# Patient Record
Sex: FEMALE | Race: BLACK OR AFRICAN AMERICAN | NOT HISPANIC OR LATINO | URBAN - METROPOLITAN AREA
[De-identification: names, ages, dates, MRNs, and addresses within clinical notes are randomized per-mention and may not be internally consistent; named-entity substitution may affect disease eponyms.]

---

## 2017-01-06 ENCOUNTER — OUTPATIENT (OUTPATIENT)
Dept: OUTPATIENT SERVICES | Facility: HOSPITAL | Age: 67
LOS: 1 days | Discharge: HOME | End: 2017-01-06
Payer: OTHER MISCELLANEOUS

## 2017-01-06 VITALS
SYSTOLIC BLOOD PRESSURE: 135 MMHG | RESPIRATION RATE: 16 BRPM | WEIGHT: 150.8 LBS | HEART RATE: 69 BPM | TEMPERATURE: 99 F | DIASTOLIC BLOOD PRESSURE: 73 MMHG | OXYGEN SATURATION: 97 % | HEIGHT: 64 IN

## 2017-01-06 VITALS
HEART RATE: 62 BPM | OXYGEN SATURATION: 96 % | TEMPERATURE: 98 F | DIASTOLIC BLOOD PRESSURE: 82 MMHG | SYSTOLIC BLOOD PRESSURE: 140 MMHG | RESPIRATION RATE: 16 BRPM

## 2017-01-06 DIAGNOSIS — E04.9 NONTOXIC GOITER, UNSPECIFIED: ICD-10-CM

## 2017-01-06 DIAGNOSIS — Z90.89 ACQUIRED ABSENCE OF OTHER ORGANS: Chronic | ICD-10-CM

## 2017-01-06 DIAGNOSIS — R56.9 UNSPECIFIED CONVULSIONS: ICD-10-CM

## 2017-01-06 DIAGNOSIS — Z96.659 PRESENCE OF UNSPECIFIED ARTIFICIAL KNEE JOINT: Chronic | ICD-10-CM

## 2017-01-06 DIAGNOSIS — Z90.710 ACQUIRED ABSENCE OF BOTH CERVIX AND UTERUS: Chronic | ICD-10-CM

## 2017-01-06 DIAGNOSIS — M24.661 ANKYLOSIS, RIGHT KNEE: ICD-10-CM

## 2017-01-06 DIAGNOSIS — Z41.9 ENCOUNTER FOR PROCEDURE FOR PURPOSES OTHER THAN REMEDYING HEALTH STATE, UNSPECIFIED: Chronic | ICD-10-CM

## 2017-01-06 PROCEDURE — 27335 REMOVE KNEE JOINT LINING: CPT | Mod: RT

## 2017-01-06 PROCEDURE — 88304 TISSUE EXAM BY PATHOLOGIST: CPT

## 2017-01-06 RX ORDER — ONDANSETRON 8 MG/1
4 TABLET, FILM COATED ORAL ONCE
Qty: 0 | Refills: 0 | Status: DISCONTINUED | OUTPATIENT
Start: 2017-01-06 | End: 2017-01-06

## 2017-01-06 RX ORDER — MORPHINE SULFATE 50 MG/1
4 CAPSULE, EXTENDED RELEASE ORAL
Qty: 0 | Refills: 0 | Status: DISCONTINUED | OUTPATIENT
Start: 2017-01-06 | End: 2017-01-06

## 2017-01-06 RX ORDER — SODIUM CHLORIDE 9 MG/ML
1000 INJECTION, SOLUTION INTRAVENOUS
Qty: 0 | Refills: 0 | Status: DISCONTINUED | OUTPATIENT
Start: 2017-01-06 | End: 2017-01-06

## 2017-01-06 RX ORDER — NABUMETONE 750 MG
1 TABLET ORAL
Qty: 14 | Refills: 0 | OUTPATIENT
Start: 2017-01-06 | End: 2017-01-13

## 2017-01-06 NOTE — PACU DISCHARGE NOTE - COMMENTS
Discharge instructions given to patient and family member who verbalized understanding. denies pain, cleared by MD for Discharge home

## 2017-01-06 NOTE — CONSULT NOTE ADULT - SUBJECTIVE AND OBJECTIVE BOX
HPI:      PAST MEDICAL & SURGICAL HISTORY:  OA (osteoarthritis)  Seizure: Last seizure &gt; 5 years  H/O: hysterectomy: partial for fibroids  Surgery, elective: Right shoulder sx  History of tonsillectomy  Total knee replacement status: left knee 2009      REVIEW OF SYSTEMS    General:  malaise	  Skin/Breast: normal  Ophthalmologic: negative  ENMT:	normal  Respiratory and Thorax: normal  Cardiovascular:	normal  Gastrointestinal:	normal  Genitourinary:	normal  Musculoskeletal:	swelling   Neurological:	normal  Psychiatric:	normal  Hematology/Lymphatics:	negative  Endocrine:	negative  Allergic/Immunologic:	negative      MEDICATIONS  (STANDING):    MEDICATIONS  (PRN):      Allergies    penicillamine (Hives)  shellfish (Hives)    Intolerances        SOCIAL HISTORY:    FAMILY HISTORY:      PHYSICAL EXAM:  Daily     Daily     Vital Signs Last 24 Hrs  T(C): --  T(F): --  HR: --  BP: --  BP(mean): --  RR: --  SpO2: --    Constitutional: WDWNF in NAD  Eyes: conj pale  ENMT: negative  Neck: supple  Breasts: not examined   Back: negative  Respiratory: clear to P&A  Cardiovascular: no MRGT or H  Gastrointestinal: normal bowel sounds  Genitourinary: neg  Rectal: not examined  Extremities: edema          leg  Vascular: normal  Neurological: normal  Skin: negative  Lymph Nodes: negative  Musculoskeletal:   decreased ROM    Psychiatric: anxiety      LABS: HPI:  66 year old female with right knee DJD s/p right TKR with moderate pain and decreased ROM.  X rays consistent with arthrofibrosis.  Symptoms worse after prolonged imobility or stairs and persist since surgery 2/05/2016. Original injury at work.    PAST MEDICAL & SURGICAL HISTORY:  allergic rhinitis goiter  OA (osteoarthritis)  Seizure: Last seizure 5 years ago  H/O: hysterectomy: partial for fibroids  Surgery, elective: Right shoulder sx  History of tonsillectomy  Total knee replacement status: left knee 2009      REVIEW OF SYSTEMS    General:  malaise	  Skin/Breast: normal  Ophthalmologic: negative  ENMT:	normal  Respiratory and Thorax: normal  Cardiovascular:	normal  Gastrointestinal:	normal  Genitourinary:	normal  Musculoskeletal: right knee swelling   Neurological:	normal  Psychiatric:	normal  Hematology/Lymphatics:	negative  Endocrine:	negative  Allergic/Immunologic:	negative      MEDICATIONS     Dilantin 100mg ER 2 in am 1 in pm         Allergies    penicillamine (Hives)  shellfish (Hives)       SOCIAL HISTORY: cigs:  1/4  ppd    FAMILY HISTORY: breast cancer      PHYSICAL EXAM:  Ht:  5' 4 1/2"  Wt:  68kg  Vital Signs Last 24 Hrs  T(C): --  T(F): --98.6  HR: --74  BP: --120/80  BP(mean): --  RR: --16  SpO2: --    Constitutional: WDWNF in NAD  Eyes: conj pink  ENMT: negative  Neck: supple  Breasts: not examined   Back: negative  Respiratory: clear to P&A  Cardiovascular: no MRGT or H  Gastrointestinal: normal bowel sounds  Genitourinary: neg  Rectal: not examined  Extremities: edema   right leg  Vascular: normal  Neurological: normal  Skin: negative  Lymph Nodes: negative  Musculoskeletal:   decreased ROM  right knee  Psychiatric: anxiety      LABS:

## 2017-01-06 NOTE — ASU PATIENT PROFILE, ADULT - PSH
H/O: hysterectomy  partial for fibroids  History of tonsillectomy    Surgery, elective  Right shoulder sx  Total knee replacement status  left knee 2009

## 2017-01-10 DIAGNOSIS — Z79.899 OTHER LONG TERM (CURRENT) DRUG THERAPY: ICD-10-CM

## 2017-01-10 DIAGNOSIS — M13.88 OTHER SPECIFIED ARTHRITIS, OTHER SITE: ICD-10-CM

## 2017-01-10 DIAGNOSIS — M23.8X1 OTHER INTERNAL DERANGEMENTS OF RIGHT KNEE: ICD-10-CM

## 2017-01-10 LAB — SURGICAL PATHOLOGY STUDY: SIGNIFICANT CHANGE UP

## 2017-02-12 PROBLEM — M19.90 UNSPECIFIED OSTEOARTHRITIS, UNSPECIFIED SITE: Chronic | Status: ACTIVE | Noted: 2017-01-05

## 2017-02-22 ENCOUNTER — RECORD ABSTRACTING (OUTPATIENT)
Age: 67
End: 2017-02-22

## 2017-02-22 DIAGNOSIS — F17.200 NICOTINE DEPENDENCE, UNSPECIFIED, UNCOMPLICATED: ICD-10-CM

## 2017-02-22 DIAGNOSIS — M70.51 OTHER BURSITIS OF KNEE, RIGHT KNEE: ICD-10-CM

## 2017-02-22 DIAGNOSIS — M17.11 UNILATERAL PRIMARY OSTEOARTHRITIS, RIGHT KNEE: ICD-10-CM

## 2017-02-22 DIAGNOSIS — R56.9 UNSPECIFIED CONVULSIONS: ICD-10-CM

## 2017-02-22 DIAGNOSIS — Z80.9 FAMILY HISTORY OF MALIGNANT NEOPLASM, UNSPECIFIED: ICD-10-CM

## 2017-02-22 DIAGNOSIS — Z82.61 FAMILY HISTORY OF ARTHRITIS: ICD-10-CM

## 2017-02-22 RX ORDER — ACETAMINOPHEN AND CODEINE PHOSPHATE 300; 30 MG/1; MG/1
300-30 TABLET ORAL
Refills: 0 | Status: ACTIVE | COMMUNITY

## 2017-02-22 RX ORDER — PHENYTOIN 125 MG/5ML
125 SUSPENSION ORAL
Refills: 0 | Status: ACTIVE | COMMUNITY

## 2017-02-22 RX ORDER — CELECOXIB 100 MG/1
100 CAPSULE ORAL
Refills: 0 | Status: ACTIVE | COMMUNITY

## 2017-03-07 ENCOUNTER — APPOINTMENT (OUTPATIENT)
Dept: ORTHOPEDIC SURGERY | Facility: CLINIC | Age: 67
End: 2017-03-07

## 2017-03-07 VITALS
BODY MASS INDEX: 24.49 KG/M2 | HEIGHT: 65 IN | HEART RATE: 87 BPM | DIASTOLIC BLOOD PRESSURE: 83 MMHG | WEIGHT: 147 LBS | SYSTOLIC BLOOD PRESSURE: 182 MMHG

## 2017-03-07 DIAGNOSIS — Z96.651 PRESENCE OF RIGHT ARTIFICIAL KNEE JOINT: ICD-10-CM

## 2017-04-18 ENCOUNTER — APPOINTMENT (OUTPATIENT)
Dept: ORTHOPEDIC SURGERY | Facility: CLINIC | Age: 67
End: 2017-04-18

## 2017-04-18 RX ORDER — CYCLOSPORINE 0.5 MG/ML
0.05 EMULSION OPHTHALMIC
Qty: 60 | Refills: 0 | Status: ACTIVE | COMMUNITY
Start: 2017-03-28

## 2017-04-18 RX ORDER — TRIAMCINOLONE ACETONIDE 5 MG/G
0.5 CREAM TOPICAL
Qty: 45 | Refills: 0 | Status: ACTIVE | COMMUNITY
Start: 2016-11-06

## 2017-04-18 RX ORDER — TRIAMCINOLONE ACETONIDE 5 MG/G
0.5 OINTMENT TOPICAL
Qty: 150 | Refills: 0 | Status: ACTIVE | COMMUNITY
Start: 2016-11-21

## 2017-04-18 RX ORDER — HYDROCODONE BITARTRATE AND ACETAMINOPHEN 5; 325 MG/1; MG/1
5-325 TABLET ORAL
Qty: 30 | Refills: 0 | Status: ACTIVE | COMMUNITY
Start: 2017-01-06

## 2017-04-18 RX ORDER — NABUMETONE 500 MG/1
500 TABLET, FILM COATED ORAL
Qty: 14 | Refills: 0 | Status: ACTIVE | COMMUNITY
Start: 2017-01-06

## 2017-04-18 RX ORDER — EXTENDED PHENYTOIN SODIUM 100 MG/1
100 CAPSULE ORAL
Qty: 270 | Refills: 0 | Status: ACTIVE | COMMUNITY
Start: 2017-02-20

## 2017-04-18 RX ORDER — TRIAMCINOLONE ACETONIDE 1 MG/G
0.1 CREAM TOPICAL
Qty: 60 | Refills: 0 | Status: ACTIVE | COMMUNITY
Start: 2017-02-07

## 2017-06-13 ENCOUNTER — APPOINTMENT (OUTPATIENT)
Dept: ORTHOPEDIC SURGERY | Facility: CLINIC | Age: 67
End: 2017-06-13

## 2017-06-13 ENCOUNTER — OTHER (OUTPATIENT)
Age: 67
End: 2017-06-13

## 2017-06-13 VITALS
BODY MASS INDEX: 24.49 KG/M2 | WEIGHT: 147 LBS | HEART RATE: 77 BPM | SYSTOLIC BLOOD PRESSURE: 136 MMHG | HEIGHT: 65 IN | DIASTOLIC BLOOD PRESSURE: 91 MMHG

## 2017-08-17 ENCOUNTER — APPOINTMENT (OUTPATIENT)
Dept: ORTHOPEDIC SURGERY | Facility: CLINIC | Age: 67
End: 2017-08-17
Payer: OTHER MISCELLANEOUS

## 2017-08-17 VITALS
DIASTOLIC BLOOD PRESSURE: 80 MMHG | HEART RATE: 70 BPM | BODY MASS INDEX: 24.49 KG/M2 | HEIGHT: 65 IN | SYSTOLIC BLOOD PRESSURE: 130 MMHG | WEIGHT: 147 LBS

## 2017-08-17 PROCEDURE — 99214 OFFICE O/P EST MOD 30 MIN: CPT

## 2017-09-28 ENCOUNTER — APPOINTMENT (OUTPATIENT)
Dept: ORTHOPEDIC SURGERY | Facility: CLINIC | Age: 67
End: 2017-09-28
Payer: OTHER MISCELLANEOUS

## 2017-09-28 VITALS
SYSTOLIC BLOOD PRESSURE: 136 MMHG | WEIGHT: 147 LBS | HEART RATE: 73 BPM | BODY MASS INDEX: 24.49 KG/M2 | HEIGHT: 65 IN | DIASTOLIC BLOOD PRESSURE: 89 MMHG

## 2017-09-28 PROCEDURE — 99213 OFFICE O/P EST LOW 20 MIN: CPT

## 2017-11-14 ENCOUNTER — APPOINTMENT (OUTPATIENT)
Dept: ORTHOPEDIC SURGERY | Facility: CLINIC | Age: 67
End: 2017-11-14
Payer: OTHER MISCELLANEOUS

## 2017-11-14 VITALS
BODY MASS INDEX: 24.49 KG/M2 | HEIGHT: 65 IN | HEART RATE: 99 BPM | DIASTOLIC BLOOD PRESSURE: 91 MMHG | WEIGHT: 147 LBS | SYSTOLIC BLOOD PRESSURE: 134 MMHG

## 2017-11-14 PROCEDURE — 99213 OFFICE O/P EST LOW 20 MIN: CPT

## 2017-11-15 RX ORDER — METHOCARBAMOL 500 MG/1
500 TABLET, FILM COATED ORAL
Qty: 18 | Refills: 0 | Status: ACTIVE | COMMUNITY
Start: 2017-09-14

## 2018-01-09 ENCOUNTER — APPOINTMENT (OUTPATIENT)
Dept: ORTHOPEDIC SURGERY | Facility: CLINIC | Age: 68
End: 2018-01-09

## 2018-02-08 ENCOUNTER — APPOINTMENT (OUTPATIENT)
Dept: ORTHOPEDIC SURGERY | Facility: CLINIC | Age: 68
End: 2018-02-08
Payer: OTHER MISCELLANEOUS

## 2018-02-08 VITALS
HEIGHT: 65 IN | SYSTOLIC BLOOD PRESSURE: 100 MMHG | WEIGHT: 147 LBS | BODY MASS INDEX: 24.49 KG/M2 | HEART RATE: 65 BPM | DIASTOLIC BLOOD PRESSURE: 65 MMHG

## 2018-02-08 PROCEDURE — 99213 OFFICE O/P EST LOW 20 MIN: CPT

## 2018-04-05 ENCOUNTER — APPOINTMENT (OUTPATIENT)
Dept: ORTHOPEDIC SURGERY | Facility: CLINIC | Age: 68
End: 2018-04-05
Payer: OTHER MISCELLANEOUS

## 2018-04-05 PROCEDURE — 99213 OFFICE O/P EST LOW 20 MIN: CPT

## 2018-05-24 ENCOUNTER — APPOINTMENT (OUTPATIENT)
Dept: ORTHOPEDIC SURGERY | Facility: CLINIC | Age: 68
End: 2018-05-24
Payer: OTHER MISCELLANEOUS

## 2018-05-24 DIAGNOSIS — Z00.00 ENCOUNTER FOR GENERAL ADULT MEDICAL EXAMINATION W/OUT ABNORMAL FINDINGS: ICD-10-CM

## 2018-05-24 PROCEDURE — 99213 OFFICE O/P EST LOW 20 MIN: CPT

## 2018-07-10 ENCOUNTER — APPOINTMENT (OUTPATIENT)
Dept: ORTHOPEDIC SURGERY | Facility: CLINIC | Age: 68
End: 2018-07-10
Payer: OTHER MISCELLANEOUS

## 2018-07-10 PROCEDURE — 99213 OFFICE O/P EST LOW 20 MIN: CPT

## 2018-08-28 ENCOUNTER — APPOINTMENT (OUTPATIENT)
Dept: ORTHOPEDIC SURGERY | Facility: CLINIC | Age: 68
End: 2018-08-28
Payer: OTHER MISCELLANEOUS

## 2018-08-28 PROCEDURE — 73562 X-RAY EXAM OF KNEE 3: CPT | Mod: 50

## 2018-08-28 PROCEDURE — 99214 OFFICE O/P EST MOD 30 MIN: CPT

## 2018-10-09 ENCOUNTER — APPOINTMENT (OUTPATIENT)
Dept: ORTHOPEDIC SURGERY | Facility: CLINIC | Age: 68
End: 2018-10-09
Payer: OTHER MISCELLANEOUS

## 2018-10-09 VITALS
HEART RATE: 92 BPM | DIASTOLIC BLOOD PRESSURE: 79 MMHG | HEIGHT: 65 IN | WEIGHT: 152 LBS | SYSTOLIC BLOOD PRESSURE: 115 MMHG | BODY MASS INDEX: 25.33 KG/M2

## 2018-10-09 PROCEDURE — 99213 OFFICE O/P EST LOW 20 MIN: CPT

## 2019-01-03 ENCOUNTER — APPOINTMENT (OUTPATIENT)
Dept: ORTHOPEDIC SURGERY | Facility: CLINIC | Age: 69
End: 2019-01-03
Payer: OTHER MISCELLANEOUS

## 2019-01-03 PROCEDURE — 99213 OFFICE O/P EST LOW 20 MIN: CPT

## 2019-02-19 ENCOUNTER — APPOINTMENT (OUTPATIENT)
Dept: ORTHOPEDIC SURGERY | Facility: CLINIC | Age: 69
End: 2019-02-19
Payer: OTHER MISCELLANEOUS

## 2019-02-19 VITALS — BODY MASS INDEX: 25.33 KG/M2 | WEIGHT: 152 LBS | HEIGHT: 65 IN

## 2019-02-19 PROCEDURE — 99213 OFFICE O/P EST LOW 20 MIN: CPT

## 2019-02-19 NOTE — HISTORY OF PRESENT ILLNESS
[de-identified] : 67 year old female presents to the office s/p left TKA, DOS: 10/15/09 and right TKA, DOS: 2/5/16, for a follow-up evaluation of bilateral knee pain. The patient was last seen on 01/03/19, where she was reassured that her TKA components appear well functioning, in good position, without evidence of loosening or wear on x-ray. Patient reports having minimal discomfort and stiffness in her right knee. She admits having improvement with walking which relieves her discomfort. Patient states she has been able to return to normal activity at this point.

## 2019-02-19 NOTE — ADDENDUM
[FreeTextEntry1] : Documented by Yusuf Arita, solely acting as a scribe for Dr. Eduardo Blunt on 02/19/2019.\par \par All medical record entries made by the Scribe were at my, Dr. Eduardo Blunt, direction and personally dictated by me on 02/19/2019 . I have reviewed the chart and agree that the record accurately reflects my personal performance of the history, physical exam, assessment and plan. I have also personally directed, reviewed, and agreed with the chart.

## 2019-02-19 NOTE — DISCUSSION/SUMMARY
[de-identified] : 67 year old female presents s/p left TKA, DOS: 10/15/09 and right TKA, DOS: 2/5/16 with bilateral knee pain. We discussed the nature of the condition and treatment options. I reassured the patient that her reported knee stiffness and residual discomfort should continue to improve with time. I reassured the patient that her TKA components appear well functioning, in good position, without evidence of loosening or wear on x-ray. The patient will follow up in 6 weeks.

## 2019-02-19 NOTE — PHYSICAL EXAM
[Normal] : Gait: normal [LE] : Sensory: Intact in bilateral lower extremities [ALL] : Biceps, brachioradialis, triceps, patellar, ankle and plantar 2+ and symmetric bilaterally [DP] : dorsalis pedis 2+ and symmetric bilaterally [PT] : posterior tibial 2+ and symmetric bilaterally [Poor Appearance] : well-appearing [Acute Distress] : not in acute distress [Obese] : not obese [de-identified] : General appearance: Well nourished, well developed, pleasant, alert, and oriented x3.\par Respiratory: Breathing not labored, in no acute distress.\par HEENT: Normocephalic. EOM intact. Sclerae are clear.\par CV: No apparent abnormalities. No lower leg edema. No varicosities. Pedal pulses are palpable.\par Neurologic: Sensation is intact to light touch in the upper and lower extremities. No muscle weakness.\par Dermatologic: No apparent skin lesions or rash.\par Spine: C spine and L spine appear normal and move freely, normal and nontender.\par Upper Extremities: Hands, wrists, and elbows are normal and move freely. Shoulders are normal and move freely. All range of motion is symmetrical.\par Normal body habitus. Pulses are palpable.\par Review of systems, please see form for complete details. Medical data sheet was reviewed.\par \par Left knee, FROM hip, midline incision well healed, no effusion, 0 - 130, minimal crepitus, no medial pain, no lateral pain, no Lachman, no pivot shift, no anterior or posterior drawer, stable, anatomic alignment \par Right knee, FROM hip, midline incision well healed,  no effusion, 0 - 125, minimal crepitus, no medial pain, no lateral pain, no Lachman, no pivot shift, no anterior or posterior drawer, stable, anatomic alignment \par

## 2019-04-30 ENCOUNTER — APPOINTMENT (OUTPATIENT)
Dept: ORTHOPEDIC SURGERY | Facility: CLINIC | Age: 69
End: 2019-04-30
Payer: OTHER MISCELLANEOUS

## 2019-04-30 VITALS — BODY MASS INDEX: 25.33 KG/M2 | HEIGHT: 65 IN | WEIGHT: 152 LBS

## 2019-04-30 DIAGNOSIS — Z47.1 AFTERCARE FOLLOWING JOINT REPLACEMENT SURGERY: ICD-10-CM

## 2019-04-30 PROCEDURE — 73562 X-RAY EXAM OF KNEE 3: CPT | Mod: 50

## 2019-04-30 PROCEDURE — 99214 OFFICE O/P EST MOD 30 MIN: CPT

## 2019-04-30 NOTE — HISTORY OF PRESENT ILLNESS
[de-identified] : 67 year old female presents to the office s/p left TKA, DOS: 10/15/09 and right TKA, DOS: 2/5/16, for a follow-up evaluation of bilateral knee pain. The patient was last seen on 02/19/19, where she was reassured that her TKA components appear well functioning, in good position, without evidence of loosening or wear on x-ray. Patient reports having minimal discomfort and stiffness in her right knee. Pt states she has had increasing anterior pain over the past two days. No known injury. Attempted Aleve and home exercises sans relief. Pt notes pain correlated with weather.

## 2019-04-30 NOTE — ADDENDUM
[FreeTextEntry1] : I, Hermes Nuno, acted solely as a scribe for Dr. Eduardo Blunt on 04/30/2019.\par \par All medical record entries made by the scribe were at my, Dr. Eduardo Blunt, direction and personally dictated by me on 04/30/2019. I have reviewed the chart and agree that the record accurately reflects my personal performance of the history, physical exam, assessment and plan. I have also personally directed, reviewed, and agreed with the chart.

## 2019-04-30 NOTE — DISCUSSION/SUMMARY
[de-identified] : 67 year old female presents s/p left TKA, DOS: 10/15/09 and right TKA, DOS: 2/5/16 with bilateral knee pain. We discussed the nature of the condition and treatment options. Pt attributes symptoms possibly to weather. If symptoms persist, we will order an MRI. Pt will FU in 6 weeks.

## 2019-04-30 NOTE — PHYSICAL EXAM
[Normal] : Gait: normal [LE] : Sensory: Intact in bilateral lower extremities [ALL] : Biceps, brachioradialis, triceps, patellar, ankle and plantar 2+ and symmetric bilaterally [DP] : dorsalis pedis 2+ and symmetric bilaterally [PT] : posterior tibial 2+ and symmetric bilaterally [Poor Appearance] : well-appearing [Acute Distress] : not in acute distress [Obese] : not obese [de-identified] : General appearance: Well nourished, well developed, pleasant, alert, and oriented x3.\par Respiratory: Breathing not labored, in no acute distress.\par HEENT: Normocephalic. EOM intact. Sclerae are clear.\par CV: No apparent abnormalities. No lower leg edema. No varicosities. Pedal pulses are palpable.\par Neurologic: Sensation is intact to light touch in the upper and lower extremities. No muscle weakness.\par Dermatologic: No apparent skin lesions or rash.\par Spine: C spine and L spine appear normal and move freely, normal and nontender.\par Upper Extremities: Hands, wrists, and elbows are normal and move freely. Shoulders are normal and move freely. All range of motion is symmetrical.\par Normal body habitus. Pulses are palpable.\par Review of systems, please see form for complete details. Medical data sheet was reviewed.\par \par Left knee, FROM hip, midline incision well healed, no effusion, 0 - 130, minimal crepitus, no medial pain, no lateral pain, no Lachman, no pivot shift, no anterior or posterior drawer, stable, anatomic alignment \par Right knee, FROM hip, midline incision well healed,  no effusion, 0 - 120, minimal crepitus, + anterior pain, no medial pain, no lateral pain, no Lachman, no pivot shift, no anterior or posterior drawer, stable, anatomic alignment \par   [de-identified] : Right Knee xrays, taken at the office today:\par Standing AP, Lateral, and Merchant films show:\par Total knee replacement hardware in neutral alignment, without evidence of loosening, patella tracks well\par \par Left Knee xrays, taken at the office today:\par Standing AP, Lateral, and Merchant films show:\par Total knee replacement hardware in neutral alignment, without evidence of loosening, patella tracks well

## 2019-08-05 PROBLEM — R56.9 SEIZURES: Status: RESOLVED | Noted: 2017-02-22 | Resolved: 2019-08-05

## 2022-07-08 ENCOUNTER — OFFICE (OUTPATIENT)
Dept: URBAN - METROPOLITAN AREA CLINIC 86 | Facility: CLINIC | Age: 72
Setting detail: OPHTHALMOLOGY
End: 2022-07-08
Payer: COMMERCIAL

## 2022-07-08 DIAGNOSIS — H52.4: ICD-10-CM

## 2022-07-08 DIAGNOSIS — H01.004: ICD-10-CM

## 2022-07-08 DIAGNOSIS — H25.13: ICD-10-CM

## 2022-07-08 DIAGNOSIS — H40.003: ICD-10-CM

## 2022-07-08 DIAGNOSIS — H01.001: ICD-10-CM

## 2022-07-08 PROCEDURE — 92250 FUNDUS PHOTOGRAPHY W/I&R: CPT | Performed by: OPHTHALMOLOGY

## 2022-07-08 PROCEDURE — 92004 COMPRE OPH EXAM NEW PT 1/>: CPT | Performed by: OPHTHALMOLOGY

## 2022-07-08 PROCEDURE — 92015 DETERMINE REFRACTIVE STATE: CPT | Performed by: OPHTHALMOLOGY

## 2022-07-08 ASSESSMENT — REFRACTION_MANIFEST
OD_VA1: 20/20-1
OS_CYLINDER: +0.25
OS_AXIS: 169
OS_SPHERE: +2.25
OD_AXIS: 068
OS_VA1: 20/20-1
OD_SPHERE: +1.25
OD_ADD: +2.50
OD_CYLINDER: +1.00
OS_ADD: +2.50

## 2022-07-08 ASSESSMENT — REFRACTION_CURRENTRX
OD_ADD: +2.50
OS_OVR_VA: 20/
OD_CYLINDER: +1.00
OD_AXIS: 068
OS_SPHERE: +2.25
OS_ADD: +2.50
OD_SPHERE: +1.25
OD_OVR_VA: 20/
OS_CYLINDER: +0.25
OS_AXIS: 169

## 2022-07-08 ASSESSMENT — VISUAL ACUITY
OD_BCVA: 20/20-1
OS_BCVA: 20/25-2

## 2022-07-08 ASSESSMENT — SPHEQUIV_DERIVED
OS_SPHEQUIV: 2.375
OD_SPHEQUIV: 1.75

## 2022-07-08 ASSESSMENT — LID EXAM ASSESSMENTS
OD_BLEPHARITIS: T
OS_BLEPHARITIS: T

## 2022-07-08 ASSESSMENT — CONFRONTATIONAL VISUAL FIELD TEST (CVF)
OS_FINDINGS: FULL
OD_FINDINGS: FULL

## 2022-07-08 ASSESSMENT — TONOMETRY
OS_IOP_MMHG: 14
OD_IOP_MMHG: 14

## 2022-07-28 ENCOUNTER — OFFICE (OUTPATIENT)
Dept: URBAN - METROPOLITAN AREA CLINIC 86 | Facility: CLINIC | Age: 72
Setting detail: OPHTHALMOLOGY
End: 2022-07-28
Payer: COMMERCIAL

## 2022-07-28 DIAGNOSIS — H40.003: ICD-10-CM

## 2022-07-28 DIAGNOSIS — H25.13: ICD-10-CM

## 2022-07-28 PROBLEM — H01.004 BLEPHARITIS; RIGHT UPPER LID, LEFT UPPER LID: Status: RESOLVED | Noted: 2022-07-08 | Resolved: 2022-07-28

## 2022-07-28 PROBLEM — H01.001 BLEPHARITIS; RIGHT UPPER LID, LEFT UPPER LID: Status: RESOLVED | Noted: 2022-07-08 | Resolved: 2022-07-28

## 2022-07-28 PROCEDURE — 92083 EXTENDED VISUAL FIELD XM: CPT | Performed by: OPHTHALMOLOGY

## 2022-07-28 PROCEDURE — 92020 GONIOSCOPY: CPT | Performed by: OPHTHALMOLOGY

## 2022-07-28 PROCEDURE — 99213 OFFICE O/P EST LOW 20 MIN: CPT | Performed by: OPHTHALMOLOGY

## 2022-07-28 PROCEDURE — 76514 ECHO EXAM OF EYE THICKNESS: CPT | Performed by: OPHTHALMOLOGY

## 2022-07-28 PROCEDURE — 92133 CPTRZD OPH DX IMG PST SGM ON: CPT | Performed by: OPHTHALMOLOGY

## 2022-07-28 ASSESSMENT — REFRACTION_MANIFEST
OD_VA1: 20/20-1
OD_CYLINDER: +1.00
OS_ADD: +2.50
OD_SPHERE: +1.25
OS_SPHERE: +2.25
OS_AXIS: 169
OS_CYLINDER: +0.25
OD_ADD: +2.50
OS_VA1: 20/20-1
OD_AXIS: 068

## 2022-07-28 ASSESSMENT — REFRACTION_CURRENTRX
OS_CYLINDER: +0.25
OD_CYLINDER: +1.00
OD_SPHERE: +1.25
OS_ADD: +2.50
OS_AXIS: 169
OS_SPHERE: +2.25
OD_ADD: +2.50
OD_OVR_VA: 20/
OD_AXIS: 068
OS_OVR_VA: 20/

## 2022-07-28 ASSESSMENT — VISUAL ACUITY
OD_BCVA: 20/20-2
OS_BCVA: 20/20

## 2022-07-28 ASSESSMENT — SPHEQUIV_DERIVED
OD_SPHEQUIV: 1.75
OS_SPHEQUIV: 2.375

## 2022-07-28 ASSESSMENT — CONFRONTATIONAL VISUAL FIELD TEST (CVF)
OS_FINDINGS: FULL
OD_FINDINGS: FULL

## 2022-07-28 ASSESSMENT — PACHYMETRY
OD_CT_CORRECTION: 1
OS_CT_CORRECTION: 1
OD_CT_UM: 533
OS_CT_UM: 533

## 2022-07-28 ASSESSMENT — TONOMETRY
OS_IOP_MMHG: 16
OD_IOP_MMHG: 16

## 2023-01-30 ENCOUNTER — OFFICE (OUTPATIENT)
Dept: URBAN - METROPOLITAN AREA CLINIC 86 | Facility: CLINIC | Age: 73
Setting detail: OPHTHALMOLOGY
End: 2023-01-30
Payer: COMMERCIAL

## 2023-01-30 DIAGNOSIS — H40.003: ICD-10-CM

## 2023-01-30 DIAGNOSIS — H25.13: ICD-10-CM

## 2023-01-30 PROCEDURE — 99213 OFFICE O/P EST LOW 20 MIN: CPT | Performed by: OPHTHALMOLOGY

## 2023-01-30 PROCEDURE — 92083 EXTENDED VISUAL FIELD XM: CPT | Performed by: OPHTHALMOLOGY

## 2023-01-30 ASSESSMENT — REFRACTION_CURRENTRX
OS_OVR_VA: 20/
OS_AXIS: 169
OS_CYLINDER: +0.25
OD_AXIS: 068
OD_ADD: +2.50
OD_SPHERE: +1.25
OD_CYLINDER: +1.00
OD_OVR_VA: 20/
OS_SPHERE: +2.25
OS_ADD: +2.50

## 2023-01-30 ASSESSMENT — REFRACTION_MANIFEST
OS_AXIS: 169
OD_VA1: 20/20-1
OS_CYLINDER: +0.25
OD_AXIS: 068
OS_VA1: 20/20-1
OS_ADD: +2.50
OD_SPHERE: +1.25
OD_ADD: +2.50
OS_SPHERE: +2.25
OD_CYLINDER: +1.00

## 2023-01-30 ASSESSMENT — TONOMETRY
OS_IOP_MMHG: 18
OD_IOP_MMHG: 18

## 2023-01-30 ASSESSMENT — PACHYMETRY
OD_CT_UM: 533
OS_CT_CORRECTION: 1
OD_CT_CORRECTION: 1
OS_CT_UM: 533

## 2023-01-30 ASSESSMENT — SPHEQUIV_DERIVED
OD_SPHEQUIV: 1.75
OS_SPHEQUIV: 2.375

## 2023-01-30 ASSESSMENT — CONFRONTATIONAL VISUAL FIELD TEST (CVF)
OD_FINDINGS: FULL
OS_FINDINGS: FULL

## 2023-01-30 ASSESSMENT — VISUAL ACUITY
OD_BCVA: 20/25-
OS_BCVA: 20/25+

## 2023-07-31 ENCOUNTER — OFFICE (OUTPATIENT)
Dept: URBAN - METROPOLITAN AREA CLINIC 86 | Facility: CLINIC | Age: 73
Setting detail: OPHTHALMOLOGY
End: 2023-07-31
Payer: MEDICARE

## 2023-07-31 DIAGNOSIS — H25.13: ICD-10-CM

## 2023-07-31 DIAGNOSIS — H40.003: ICD-10-CM

## 2023-07-31 DIAGNOSIS — H52.4: ICD-10-CM

## 2023-07-31 PROCEDURE — 92250 FUNDUS PHOTOGRAPHY W/I&R: CPT | Performed by: OPHTHALMOLOGY

## 2023-07-31 PROCEDURE — 92015 DETERMINE REFRACTIVE STATE: CPT | Performed by: OPHTHALMOLOGY

## 2023-07-31 PROCEDURE — 99214 OFFICE O/P EST MOD 30 MIN: CPT | Performed by: OPHTHALMOLOGY

## 2023-07-31 PROCEDURE — 92020 GONIOSCOPY: CPT | Performed by: OPHTHALMOLOGY

## 2023-07-31 ASSESSMENT — VISUAL ACUITY
OD_BCVA: 20/30-
OS_BCVA: 20/30-

## 2023-07-31 ASSESSMENT — PACHYMETRY
OD_CT_CORRECTION: 1
OD_CT_UM: 533
OS_CT_UM: 533
OS_CT_CORRECTION: 1

## 2023-07-31 ASSESSMENT — REFRACTION_CURRENTRX
OS_SPHERE: +2.00
OD_AXIS: 068
OS_ADD: +2.50
OD_OVR_VA: 20/
OD_SPHERE: +1.25
OD_ADD: +2.50
OD_CYLINDER: +1.00
OS_OVR_VA: 20/

## 2023-07-31 ASSESSMENT — TONOMETRY
OD_IOP_MMHG: 16
OS_IOP_MMHG: 16

## 2023-07-31 ASSESSMENT — CONFRONTATIONAL VISUAL FIELD TEST (CVF)
OD_FINDINGS: FULL
OS_FINDINGS: FULL

## 2023-07-31 ASSESSMENT — REFRACTION_MANIFEST
OD_CYLINDER: +0.50
OS_ADD: +2.50
OD_VA1: 20/20-1
OS_VA1: 20/20-1
OD_ADD: +2.50
OD_SPHERE: +1.75
OD_AXIS: 070
OS_SPHERE: +2.50

## 2023-07-31 ASSESSMENT — SPHEQUIV_DERIVED: OD_SPHEQUIV: 2

## 2023-08-17 ENCOUNTER — OFFICE (OUTPATIENT)
Dept: URBAN - METROPOLITAN AREA CLINIC 86 | Facility: CLINIC | Age: 73
Setting detail: OPHTHALMOLOGY
End: 2023-08-17
Payer: MEDICARE

## 2023-08-17 DIAGNOSIS — H25.13: ICD-10-CM

## 2023-08-17 DIAGNOSIS — H40.003: ICD-10-CM

## 2023-08-17 PROCEDURE — 92133 CPTRZD OPH DX IMG PST SGM ON: CPT | Performed by: OPHTHALMOLOGY

## 2023-08-17 PROCEDURE — 99213 OFFICE O/P EST LOW 20 MIN: CPT | Performed by: OPHTHALMOLOGY

## 2023-08-17 PROCEDURE — 92083 EXTENDED VISUAL FIELD XM: CPT | Performed by: OPHTHALMOLOGY

## 2023-08-17 ASSESSMENT — REFRACTION_CURRENTRX
OD_SPHERE: +1.25
OS_SPHERE: +2.00
OD_OVR_VA: 20/
OD_CYLINDER: +1.00
OD_AXIS: 068
OS_OVR_VA: 20/
OD_ADD: +2.50
OS_ADD: +2.50

## 2023-08-17 ASSESSMENT — PACHYMETRY
OS_CT_CORRECTION: 1
OS_CT_UM: 533
OD_CT_UM: 533
OD_CT_CORRECTION: 1

## 2023-08-17 ASSESSMENT — REFRACTION_MANIFEST
OS_VA1: 20/20-1
OS_ADD: +2.50
OS_SPHERE: +2.50
OD_AXIS: 070
OD_VA1: 20/20-1
OD_CYLINDER: +0.50
OD_ADD: +2.50
OD_SPHERE: +1.75

## 2023-08-17 ASSESSMENT — VISUAL ACUITY
OS_BCVA: 20/30-
OD_BCVA: 20/25-

## 2023-08-17 ASSESSMENT — TONOMETRY
OS_IOP_MMHG: 14
OD_IOP_MMHG: 14

## 2023-08-17 ASSESSMENT — CONFRONTATIONAL VISUAL FIELD TEST (CVF)
OS_FINDINGS: FULL
OD_FINDINGS: FULL

## 2023-08-17 ASSESSMENT — SPHEQUIV_DERIVED: OD_SPHEQUIV: 2

## 2023-09-07 ENCOUNTER — OFFICE (OUTPATIENT)
Dept: URBAN - METROPOLITAN AREA CLINIC 86 | Facility: CLINIC | Age: 73
Setting detail: OPHTHALMOLOGY
End: 2023-09-07
Payer: MEDICARE

## 2023-09-07 DIAGNOSIS — H52.7: ICD-10-CM

## 2023-09-07 PROCEDURE — GLASS CHEC GLASS RECHECK/ NO CHARGE: Performed by: OPHTHALMOLOGY

## 2023-09-07 ASSESSMENT — REFRACTION_CURRENTRX
OS_OVR_VA: 20/
OD_ADD: +2.50
OD_AXIS: 068
OD_SPHERE: +1.25
OS_ADD: +2.50
OS_SPHERE: +2.00
OD_CYLINDER: +1.00
OD_OVR_VA: 20/

## 2023-09-07 ASSESSMENT — REFRACTION_MANIFEST
OD_ADD: +2.50
OS_VA1: 20/20-1
OD_SPHERE: +1.75
OD_AXIS: 070
OS_SPHERE: +2.50
OS_ADD: +2.50
OD_CYLINDER: +0.50
OD_VA1: 20/20-1

## 2023-09-07 ASSESSMENT — SPHEQUIV_DERIVED: OD_SPHEQUIV: 2

## 2023-09-07 ASSESSMENT — VISUAL ACUITY
OD_BCVA: 20/25-
OS_BCVA: 20/30-

## 2023-09-07 ASSESSMENT — CONFRONTATIONAL VISUAL FIELD TEST (CVF)
OD_FINDINGS: FULL
OS_FINDINGS: FULL

## 2024-02-16 ENCOUNTER — OFFICE (OUTPATIENT)
Dept: URBAN - METROPOLITAN AREA CLINIC 86 | Facility: CLINIC | Age: 74
Setting detail: OPHTHALMOLOGY
End: 2024-02-16
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
## 2024-02-16 PROCEDURE — NO SHOW FE NO SHOW FEE: Performed by: OPHTHALMOLOGY

## 2024-03-26 ENCOUNTER — OFFICE (OUTPATIENT)
Dept: URBAN - METROPOLITAN AREA CLINIC 86 | Facility: CLINIC | Age: 74
Setting detail: OPHTHALMOLOGY
End: 2024-03-26
Payer: MEDICARE

## 2024-03-26 DIAGNOSIS — H01.004: ICD-10-CM

## 2024-03-26 DIAGNOSIS — H40.003: ICD-10-CM

## 2024-03-26 DIAGNOSIS — H01.001: ICD-10-CM

## 2024-03-26 DIAGNOSIS — H25.13: ICD-10-CM

## 2024-03-26 PROBLEM — H16.223 DRY EYE SYNDROME K SICCA; BOTH EYES: Status: ACTIVE | Noted: 2024-03-26

## 2024-03-26 PROCEDURE — 92014 COMPRE OPH EXAM EST PT 1/>: CPT | Performed by: OPHTHALMOLOGY

## 2024-03-26 ASSESSMENT — SPHEQUIV_DERIVED: OD_SPHEQUIV: 2

## 2024-03-26 ASSESSMENT — REFRACTION_MANIFEST
OD_VA1: 20/20-1
OD_CYLINDER: +0.50
OD_SPHERE: +1.75
OS_ADD: +2.50
OS_SPHERE: +2.50
OS_VA1: 20/20-1
OD_AXIS: 070
OD_ADD: +2.50

## 2024-03-26 ASSESSMENT — REFRACTION_CURRENTRX
OD_SPHERE: +1.75
OS_ADD: +2.50
OD_CYLINDER: +0.50
OD_VPRISM_DIRECTION: PROGS
OS_OVR_VA: 20/
OD_AXIS: 70
OD_ADD: +2.50
OD_OVR_VA: 20/
OS_SPHERE: +2.50
OS_VPRISM_DIRECTION: PROGS

## 2024-03-26 ASSESSMENT — LID EXAM ASSESSMENTS
OS_BLEPHARITIS: LUL 1+
OD_BLEPHARITIS: RUL 1+

## 2024-04-19 ENCOUNTER — OFFICE (OUTPATIENT)
Dept: URBAN - METROPOLITAN AREA CLINIC 86 | Facility: CLINIC | Age: 74
Setting detail: OPHTHALMOLOGY
End: 2024-04-19
Payer: MEDICARE

## 2024-04-19 DIAGNOSIS — H40.003: ICD-10-CM

## 2024-04-19 DIAGNOSIS — H25.13: ICD-10-CM

## 2024-04-19 PROCEDURE — 99213 OFFICE O/P EST LOW 20 MIN: CPT | Performed by: OPHTHALMOLOGY

## 2024-04-19 PROCEDURE — 92083 EXTENDED VISUAL FIELD XM: CPT | Performed by: OPHTHALMOLOGY

## 2024-10-25 ENCOUNTER — OFFICE (OUTPATIENT)
Dept: URBAN - METROPOLITAN AREA CLINIC 86 | Facility: CLINIC | Age: 74
Setting detail: OPHTHALMOLOGY
End: 2024-10-25
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
## 2024-10-25 PROCEDURE — NO SHOW FE NO SHOW FEE: Performed by: OPHTHALMOLOGY
